# Patient Record
Sex: FEMALE | Race: WHITE | NOT HISPANIC OR LATINO | RURAL
[De-identification: names, ages, dates, MRNs, and addresses within clinical notes are randomized per-mention and may not be internally consistent; named-entity substitution may affect disease eponyms.]

---

## 2023-05-15 ENCOUNTER — OFFICE VISIT (OUTPATIENT)
Dept: SLEEP MEDICINE | Facility: CLINIC | Age: 28
End: 2023-05-15
Payer: COMMERCIAL

## 2023-05-15 VITALS
HEART RATE: 94 BPM | DIASTOLIC BLOOD PRESSURE: 64 MMHG | SYSTOLIC BLOOD PRESSURE: 118 MMHG | BODY MASS INDEX: 25.83 KG/M2 | WEIGHT: 155 LBS | HEIGHT: 65 IN | OXYGEN SATURATION: 97 %

## 2023-05-15 DIAGNOSIS — F51.04 PSYCHOPHYSIOLOGICAL INSOMNIA: ICD-10-CM

## 2023-05-15 DIAGNOSIS — G47.8 NON-RESTORATIVE SLEEP: Primary | ICD-10-CM

## 2023-05-15 DIAGNOSIS — G47.00 FREQUENT NOCTURNAL AWAKENING: ICD-10-CM

## 2023-05-15 DIAGNOSIS — G47.19 EXCESSIVE DAYTIME SLEEPINESS: ICD-10-CM

## 2023-05-15 PROBLEM — G44.009 CLUSTER HEADACHE: Status: ACTIVE | Noted: 2023-02-07

## 2023-05-15 RX ORDER — SERTRALINE HYDROCHLORIDE 100 MG/1
1 TABLET, FILM COATED ORAL DAILY
COMMUNITY
Start: 2023-04-20

## 2023-05-15 RX ORDER — SUMATRIPTAN 25 MG/1
1 TABLET, FILM COATED ORAL DAILY
COMMUNITY
Start: 2023-02-07

## 2023-05-15 RX ORDER — ATORVASTATIN CALCIUM 10 MG/1
1 TABLET, FILM COATED ORAL DAILY
COMMUNITY
Start: 2023-02-28

## 2023-05-15 RX ORDER — ERGOCALCIFEROL 1.25 MG/1
1 CAPSULE ORAL WEEKLY
COMMUNITY
Start: 2023-02-28

## 2023-05-15 NOTE — PROGRESS NOTES
New Patient Sleep Medicine Consultation    Encounter Date: 5/15/2023         Patient's Primary Care Provider: Ivonne Welsh APRN  Referring Provider: No ref. provider found  Reason for consultation/chief complaint: excessive daytime sleepiness, unrefreshing sleep and insomnia    Mylene Stafford is a 27 y.o. female who admits to unrestful sleep, excessive daytime sleepiness, disturbed or restless sleep, up to bathroom at night and difficulty staying asleep.    She denies cataplexy, sleep paralysis, or hypnagogic hallucinations. Her bedtime is ~ 3408-9494. She  falls asleep after 30 minutes, and is up 3-4 times per night. She wakes up ~ 3747-2910. She endorses 8-9 hours of sleep. She drinks 0 cups of coffee, 0 teas, and 1 sodas per day. She drinks 0 alcoholic beverages per week. She is not a current smoker. She does not take sedatives or hypnotics. She has no sleepiness with driving. She very rarely naps.     [place] - 6  [place] Sleepiness Scale  Sitting and reading: Moderate chance of dozing  Watching TV: Moderate chance of dozing  Sitting, inactive in a public place (e.g. a theatre or a meeting): Would never doze  As a passenger in a car for an hour without a break: Would never doze  Lying down to rest in the afternoon when circumstances permit: Moderate chance of dozing  Sitting and talking to someone: Would never doze  Sitting quietly after a lunch without alcohol: Would never doze  In a car, while stopped for a few minutes in traffic: Would never doze  Total score: 6    Prior Sleep Testing: None    History reviewed. No pertinent past medical history.  Social History     Socioeconomic History   • Marital status:      Family History   Problem Relation Age of Onset   • Stroke Mother    • Heart attack Father    • Heart attack Maternal Grandfather    • Heart attack Paternal Grandfather      Prior T&A, UPPP, maxillofacial, or bariatric surgery: None  Family history of sleep disorders: None  Other family history  "+ for: As above  Occupation:   Marital status:   Children: 0  Has 1 brothers and 0 sisters  Smoking history: smoked never    Review of Systems:  Constitutional: positive for fatigue  Ears, nose, mouth, throat, and face: negative  Respiratory: negative  Cardiovascular: negative  Gastrointestinal: negative  Genitourinary:negative  Musculoskeletal:negative  Neurological: negative  Behavioral/Psych: positive for fatigue and sleep disturbance  Patient advised to discuss any positive ROS with PCP.      Vitals:    05/15/23 1038   BP: 118/64   Pulse: 94   SpO2: 97%           05/15/23  1038   Weight: 70.3 kg (155 lb)       Body mass index is 25.79 kg/m². BMI is >= 25 and <30. (Overweight) The following options were offered after discussion;: referral to primary care    Tobacco Use: Not on file       Physical Exam:        General: Alert. Cooperative. Well developed. No acute distress.   Head/Neck:  Normocephalic. Symmetrical. Atraumatic.     Neck circumference: 13\"             Eyes: Sclera clear. No icterus. PERRLA. Normal EOM.             Ears: No deformities. Normal hearing.             Nose: No septal deviation. No drainage.          Throat: No oral lesions. No thrush. Moist mucous membranes. Trachea midline.    Tongue is normal     Dentition is good       Pharynx: Posterior pharyngeal pillars are narrow    Mallampati score of II (hard and soft palate, upper portion of tonsils anduvula visible)    Pharynx is normal with unrermarkable tonsils   Chest Wall:  Normal shape. Symmetric expansion with respiration. No tenderness.          Lungs:  Clear to auscultation bilaterally. No wheezes. No rhonchi. No rales. Respirations regular, even and unlabored.            Heart:  Regular rhythm and normal rate. Normal S1 and S2. No murmur.     Abdomen:  Soft, non-tender and non-distended. Normal bowel sounds. No masses.  Extremities:  Moves all extremities well. No edema.           Pulses: Pulses palpable and " equal bilaterally.               Skin: Dry. Intact. No bleeding. No rash.           Neuro: Moves all 4 extremities and cranial nerves grossly intact.  Psychiatric: Normal mood and affect.      Current Outpatient Medications:   •  atorvastatin (LIPITOR) 10 MG tablet, Take 1 tablet by mouth Daily., Disp: , Rfl:   •  sertraline (ZOLOFT) 100 MG tablet, Take 1 tablet by mouth Daily., Disp: , Rfl:   •  SUMAtriptan (IMITREX) 25 MG tablet, Take 1 tablet by mouth Daily., Disp: , Rfl:   •  vitamin D (ERGOCALCIFEROL) 1.25 MG (13315 UT) capsule capsule, Take 1 capsule by mouth 1 (One) Time Per Week., Disp: , Rfl:     No results found for: WBC, RBC, HGB, HCT, MCV, MCH, MCHC, RDW, RDWSD, MPV, PLT, NEUTRORELPCT, LYMPHORELPCT, MONORELPCT, EOSRELPCT, BASORELPCT, AUTOIGPER, NEUTROABS, LYMPHSABS, MONOSABS, EOSABS, BASOSABS, AUTOIGNUM, NRBC, IRON, FERRITIN  No results found for: GLUCOSE, BUN, CREATININE, EGFRRESULT, EGFR, BCR, K, CO2, CALCIUM, PROTENTOTREF, ALBUMIN, BILITOT, AST, ALT    Contraindications to home sleep test: none    ASSESSMENT:  1. Excessive daytime sleepiness, presumed obstructive sleep apnea - New (to me), additional work-up planned (4)  1. Check home sleep study (disposable)  2. Follow up for results  3. Pertinent labs were reviewed as listed above  2. Frequent nocturnal awakenings - New (to me), additional work-up planned (4)  1. As above  3. Non-restorative sleep - New (to me), additional work-up planned (4)  1. As above  4. Insomnia - sleep onset/maintenance - New (to me), additional work-up planned (4)    1.   Address after further testing      I spent 30 minutes caring for Mylene on this date of service. This time includes time spent by me in the following activities: preparing for the visit, reviewing tests, obtaining and/or reviewing a separately obtained history, performing a medically appropriate examination and/or evaluation , counseling and educating the patient/family/caregiver, ordering medications, tests,  or procedures, documenting information in the medical record and care coordination; discussing Further testing    RTC 2 weeks after testing. Patient agrees to return sooner if changes in symptoms.           This document has been electronically signed by NIC Mace on May 15, 2023 11:00 CDT          CC: Ivonne Welsh APRN          No ref. provider found

## 2024-01-09 RX ORDER — QUINIDINE SULFATE 200 MG
TABLET ORAL
COMMUNITY

## 2024-01-09 RX ORDER — SUMATRIPTAN 25 MG/1
25 TABLET, FILM COATED ORAL
COMMUNITY

## 2024-01-09 RX ORDER — MULTIVIT-MIN/FERROUS GLUCONATE 9 MG/15 ML
15 LIQUID (ML) ORAL DAILY
COMMUNITY
End: 2024-01-10

## 2024-01-09 RX ORDER — ATORVASTATIN CALCIUM 10 MG/1
10 TABLET, FILM COATED ORAL DAILY
COMMUNITY
End: 2024-01-10

## 2024-01-09 RX ORDER — THIAMINE MONONITRATE (VIT B1) 100 MG
100 TABLET ORAL DAILY
COMMUNITY

## 2024-01-09 RX ORDER — ONDANSETRON HYDROCHLORIDE 8 MG/1
8 TABLET, FILM COATED ORAL EVERY 8 HOURS PRN
COMMUNITY

## 2024-01-10 ENCOUNTER — OFFICE VISIT (OUTPATIENT)
Dept: NEUROLOGY | Age: 29
End: 2024-01-10
Payer: COMMERCIAL

## 2024-01-10 VITALS
OXYGEN SATURATION: 100 % | WEIGHT: 155 LBS | SYSTOLIC BLOOD PRESSURE: 127 MMHG | DIASTOLIC BLOOD PRESSURE: 89 MMHG | BODY MASS INDEX: 25.83 KG/M2 | HEIGHT: 65 IN | HEART RATE: 118 BPM

## 2024-01-10 DIAGNOSIS — R40.0 SOMNOLENCE, DAYTIME: Primary | ICD-10-CM

## 2024-01-10 DIAGNOSIS — G47.411 CATAPLEXY: ICD-10-CM

## 2024-01-10 DIAGNOSIS — G93.32 CHRONIC FATIGUE SYNDROME: ICD-10-CM

## 2024-01-10 DIAGNOSIS — Z72.820 POOR SLEEP: ICD-10-CM

## 2024-01-10 DIAGNOSIS — G47.8 SLEEP PARALYSIS: ICD-10-CM

## 2024-01-10 PROCEDURE — 99203 OFFICE O/P NEW LOW 30 MIN: CPT | Performed by: PHYSICIAN ASSISTANT

## 2024-01-10 RX ORDER — DULOXETIN HYDROCHLORIDE 30 MG/1
60 CAPSULE, DELAYED RELEASE ORAL DAILY
COMMUNITY
Start: 2023-10-23

## 2024-01-10 NOTE — PROGRESS NOTES
REVIEW OF SYSTEMS    Constitutional: []Fever []Sweats []Chills [] Recent Injury [x] Denies all unless marked  HEENT:[x]Headache  [] Head Injury [x] Hearing Loss  [] Sore Throat  [] Ear Ache [x] Denies all unless marked  Spine:  [x] Neck pain  [x] Back pain  [] Sciaticia  [x] Denies all unless marked  Cardiovascular:[]Heart Disease []Palpitations [] Chest Pain   [x] Denies all unless marked  Pulmonary: []Shortness of Breath []Cough   [x] Denies all unless marked  Psychiatric/Behavioral:[x] Depression [x] Anxiety [x] Denies all unless marked  Gastrointestinal: []Nausea  []Vomiting  []Abdominal Pain  []Constipation  []Diarrhea  [x] Denies all unless marked  Genitourinary:   [] Frequency  [] Urgency  [] Dysuria [] Incontinence  [x] Denies all unless marked  Extremities: []Pain  []Swelling  [x] Denies all unless marked  Musculoskeletal: [] Myalgias  [] Joint Pain  [] Arthritis [] Muscle Cramps [] Muscle Twitches  [x] Denies all unless marked  Sleep: []Insomnia[]Snoring []Restless Legs  []Sleep Apnea  [x]Daytime Sleepiness  [x] Denies all unless marked  Skin:[] Rash [] Color Change [x] Denies all unless marked   Neurological:[]Visual Disturbance [] Memory Loss []Loss of Balance []Slurred Speech []Weakness []Seizures  [] Dizziness [x] Denies all unless marked      
Conjunctiva normal.  No scars, masses, or lesions over external nose or ears, hearing intact, no neck masses noted, no jugular vein distension, no bruit  Cardiac- Regular rate and rhythm  Pulmonary- Clear to auscultation, good expansion, normal effort without use of accessory muscles  Musculoskeletal - No significant wasting of muscles noted, no bony deformities  Extremities - No clubbing, cyanosis or edema  Skin - Warm, dry, and intact.  No rash, erythema, or pallor  Psychiatric - Mood, affect, and behavior appear normal      Neurological exam  Awake, alert, fluent oriented x 3 appropriate affect  Attention and concentration appear appropriate  Recent and remote memory appears unremarkable  Speech normal without dysarthria  No clear issues with language of fund of knowledge    Cranial Nerve Exam   CN II- Visual fields grossly unremarkable  CN III, IV,VI-EOMI, No nystagmus, conjugate eye movements, no ptosis  CN VII-No facial assymetry  CN VIII-Hearing  CN IX and X- No palate asymmetry  CN XI-Shoulder shrug intact  CN XII-Tongue midline with no fasciculations or fibrillations    Motor Exam  Antigravity throughout upper and lower extremities bilaterally    Gait  Normal base and speed  No ataxia    I reviewed the following studies:       []  :  Clinical laboratory test results     []  :  Radiology reports                    [x]  :  Review and summarization of medical records-referring provider, EL Newell     []     Request for medical records       []  :  Reviewed previous/recent polysomnogram report(s)       [x]  :  [place] Sleepiness Scale:       [place] Sleepiness Scale    Rate the likelihood of dozin = would never doze-\"never\"  1 = slight chance of dozing-\"rarely\"  2 = moderate chance of dozing-\"sometimes\"  3 = high chance of dozing-\"always\"    Situation Chance of Dozing (0-3)    Sitting and reading       3    Watching TV        3    Sitting, inactive in a public place (e.g. a theatre or a

## 2024-01-10 NOTE — PATIENT INSTRUCTIONS
We will schedule a sleep study (PSG) to be completed at our sleep lab followed by an MSLT (nap study) if the sleep study is negative.         Patient education: Daytime sleepiness      What is daytime sleepiness?    Daytime sleepiness is feeling sleepy during the daylight hours, when most people are awake and alert.    What causes daytime sleepiness?    Daytime sleepiness can be caused by:    ?Not having good sleep habits - For example, not having enough time to sleep at night or not having a regular sleep schedule.    ?A sleep disorder, such as:    Sleep apnea - People with this condition stop breathing for short periods during sleep.    Narcolepsy - People with this condition are very sleepy in the daytime and sometimes fall asleep suddenly during normal activities.    Insomnia - People with this condition have trouble falling or staying asleep.    ?A medical problem, such as:    Hypothyroidism - This is the medical term for when a person does not make enough thyroid hormone. This hormone controls how your body uses and stores energy.    Depression - People with this condition feel sad or down a lot of the time. They often also have trouble working or doing everyday tasks.    ?Things that disturb your sleep, such as:    Sounds - For example, if you have a new baby, they might cry and wake you up at night.    Health conditions, such as restless legs syndrome or nighttime leg cramps.    Schedule changes that affect sleep - This might include working a night shift or traveling to another time zone.    ?Medicines - Certain medicines can cause daytime sleepiness.    Is there anything I can do on my own to feel better?    That depends on the cause of your daytime sleepiness. But you can try having good sleep habits. This means that you:    ?Go to bed and get up at the same time every day.    ?Have drinks with caffeine in them only in the morning (these include coffee and tea).    ?Avoid alcohol.    ?Avoid smoking,

## 2024-01-15 PROBLEM — G93.32 CHRONIC FATIGUE SYNDROME: Status: ACTIVE | Noted: 2024-01-15

## 2024-01-15 PROBLEM — Z72.820 POOR SLEEP: Status: ACTIVE | Noted: 2024-01-15

## 2024-01-15 PROBLEM — G47.411 CATAPLEXY: Status: ACTIVE | Noted: 2024-01-15

## 2024-01-15 PROBLEM — G47.8 SLEEP PARALYSIS: Status: ACTIVE | Noted: 2024-01-15

## 2024-01-15 PROBLEM — R40.0 SOMNOLENCE, DAYTIME: Status: ACTIVE | Noted: 2024-01-15

## 2025-07-23 ENCOUNTER — TELEPHONE (OUTPATIENT)
Dept: NEUROLOGY | Age: 30
End: 2025-07-23

## 2025-07-23 NOTE — TELEPHONE ENCOUNTER
1st attemp: Received a referral for this patient. Called and left patient a VM to call our office back to where we could get patient scheduled an appointment with YOLI Magaña.